# Patient Record
Sex: MALE | Race: WHITE | Employment: FULL TIME | URBAN - NONMETROPOLITAN AREA
[De-identification: names, ages, dates, MRNs, and addresses within clinical notes are randomized per-mention and may not be internally consistent; named-entity substitution may affect disease eponyms.]

---

## 2021-01-06 ENCOUNTER — APPOINTMENT (OUTPATIENT)
Dept: GENERAL RADIOLOGY | Age: 31
End: 2021-01-06
Payer: COMMERCIAL

## 2021-01-06 ENCOUNTER — HOSPITAL ENCOUNTER (EMERGENCY)
Age: 31
Discharge: HOME | End: 2021-01-06
Attending: EMERGENCY MEDICINE
Payer: COMMERCIAL

## 2021-01-06 VITALS
DIASTOLIC BLOOD PRESSURE: 65 MMHG | HEART RATE: 55 BPM | OXYGEN SATURATION: 100 % | RESPIRATION RATE: 16 BRPM | TEMPERATURE: 98.4 F | SYSTOLIC BLOOD PRESSURE: 132 MMHG

## 2021-01-06 DIAGNOSIS — S60.00XA CONTUSION OF FINGER OF RIGHT HAND, UNSPECIFIED FINGER, INITIAL ENCOUNTER: Primary | ICD-10-CM

## 2021-01-06 PROCEDURE — 6370000000 HC RX 637 (ALT 250 FOR IP): Performed by: EMERGENCY MEDICINE

## 2021-01-06 PROCEDURE — 73130 X-RAY EXAM OF HAND: CPT

## 2021-01-06 PROCEDURE — 99283 EMERGENCY DEPT VISIT LOW MDM: CPT

## 2021-01-06 RX ORDER — NAPROXEN 500 MG/1
500 TABLET ORAL 2 TIMES DAILY
Qty: 14 TABLET | Refills: 0 | Status: SHIPPED | OUTPATIENT
Start: 2021-01-06 | End: 2021-06-30

## 2021-01-06 RX ORDER — IBUPROFEN 200 MG
400 TABLET ORAL
Status: DISCONTINUED | OUTPATIENT
Start: 2021-01-06 | End: 2021-01-06 | Stop reason: HOSPADM

## 2021-01-06 RX ADMIN — IBUPROFEN 400 MG: 200 TABLET, FILM COATED ORAL at 15:36

## 2021-01-06 ASSESSMENT — PAIN DESCRIPTION - ORIENTATION: ORIENTATION: RIGHT

## 2021-01-06 ASSESSMENT — PAIN DESCRIPTION - LOCATION: LOCATION: FINGER (COMMENT WHICH ONE)

## 2021-01-06 NOTE — ED TRIAGE NOTES
Pt to the ED with a right 1st/2nd digit injury that occurred yesterday at work (states not workers comp claim). Reports it got crunched between a couple parts at work. Pain 6/10. Reports numbness in the fingertips.

## 2021-01-08 NOTE — ED PROVIDER NOTES
right hand fingers up in 24 hours ago. Patient states that he had a crush injury at work, somebody pushed a metal  between his fingers and a hard place. Patient has sensation, vascular intact distal to the point of contact, patient     CRITICAL CARE:       CONSULTS:  None    PROCEDURES:  None    FINAL IMPRESSION      1. Contusion of finger of right hand, unspecified finger, initial encounter          DISPOSITION/PLAN   Decision To Discharge    PATIENT REFERRED TO:  No follow-up provider specified.     DISCHARGE MEDICATIONS:  Discharge Medication List as of 1/6/2021  3:33 PM      START taking these medications    Details   naproxen (NAPROSYN) 500 MG tablet Take 1 tablet by mouth 2 times daily for 7 days, Disp-14 tablet, R-0Print             (Please note that portions of this note were completed with a voice recognition program.  Efforts were made to edit the dictations but occasionally words are mis-transcribed.)    DO Aileen Husain DO  01/08/21 1124

## 2021-06-30 ENCOUNTER — HOSPITAL ENCOUNTER (EMERGENCY)
Age: 31
Discharge: HOME | End: 2021-06-30

## 2021-06-30 ENCOUNTER — APPOINTMENT (OUTPATIENT)
Dept: CT IMAGING | Age: 31
End: 2021-06-30

## 2021-06-30 VITALS
SYSTOLIC BLOOD PRESSURE: 106 MMHG | TEMPERATURE: 98.3 F | WEIGHT: 140 LBS | HEART RATE: 61 BPM | DIASTOLIC BLOOD PRESSURE: 66 MMHG | RESPIRATION RATE: 14 BRPM | OXYGEN SATURATION: 98 %
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
## 2021-06-30 DIAGNOSIS — S39.012A BACK STRAIN, INITIAL ENCOUNTER: ICD-10-CM

## 2021-06-30 PROCEDURE — 72131 CT LUMBAR SPINE W/O DYE: CPT

## 2021-06-30 PROCEDURE — 72128 CT CHEST SPINE W/O DYE: CPT

## 2021-06-30 PROCEDURE — 99282 EMERGENCY DEPT VISIT SF MDM: CPT

## 2021-06-30 PROCEDURE — 6370000000 HC RX 637 (ALT 250 FOR IP): Performed by: NURSE PRACTITIONER

## 2021-06-30 RX ORDER — NAPROXEN 500 MG/1
500 TABLET ORAL 2 TIMES DAILY WITH MEALS
Qty: 30 TABLET | Refills: 0 | Status: SHIPPED | OUTPATIENT
Start: 2021-06-30 | End: 2021-07-15

## 2021-06-30 RX ORDER — LIDOCAINE 4 G/G
2 PATCH TOPICAL DAILY
Status: DISCONTINUED | OUTPATIENT
Start: 2021-06-30 | End: 2021-06-30 | Stop reason: HOSPADM

## 2021-06-30 RX ORDER — TIZANIDINE 4 MG/1
4 TABLET ORAL ONCE
Status: COMPLETED | OUTPATIENT
Start: 2021-06-30 | End: 2021-06-30

## 2021-06-30 RX ORDER — TIZANIDINE 4 MG/1
4 TABLET ORAL EVERY 6 HOURS PRN
Qty: 20 TABLET | Refills: 0 | Status: SHIPPED | OUTPATIENT
Start: 2021-06-30

## 2021-06-30 RX ORDER — NAPROXEN 250 MG/1
500 TABLET ORAL ONCE
Status: COMPLETED | OUTPATIENT
Start: 2021-06-30 | End: 2021-06-30

## 2021-06-30 RX ORDER — LIDOCAINE 4 G/G
1 PATCH TOPICAL DAILY
Qty: 30 PATCH | Refills: 0 | Status: SHIPPED | OUTPATIENT
Start: 2021-06-30 | End: 2021-07-30

## 2021-06-30 RX ADMIN — TIZANIDINE 4 MG: 4 TABLET ORAL at 12:16

## 2021-06-30 RX ADMIN — NAPROXEN 500 MG: 250 TABLET ORAL at 12:16

## 2021-06-30 ASSESSMENT — ENCOUNTER SYMPTOMS
BACK PAIN: 1
COLOR CHANGE: 0
STRIDOR: 0
COUGH: 0
WHEEZING: 0
ABDOMINAL PAIN: 0
SHORTNESS OF BREATH: 0
VOMITING: 0
NAUSEA: 0
CHEST TIGHTNESS: 0

## 2021-06-30 ASSESSMENT — PAIN DESCRIPTION - DESCRIPTORS: DESCRIPTORS: ACHING

## 2021-06-30 ASSESSMENT — PAIN DESCRIPTION - FREQUENCY: FREQUENCY: CONTINUOUS

## 2021-06-30 ASSESSMENT — PAIN SCALES - GENERAL
PAINLEVEL_OUTOF10: 7
PAINLEVEL_OUTOF10: 7

## 2021-06-30 ASSESSMENT — PAIN DESCRIPTION - LOCATION: LOCATION: BACK

## 2021-06-30 ASSESSMENT — PAIN DESCRIPTION - ORIENTATION: ORIENTATION: LOWER

## 2021-06-30 NOTE — ED PROVIDER NOTES
[facility] Emergency Department    CHIEF COMPLAINT       Chief Complaint   Patient presents with    Back Pain       Nurses Notes reviewed and I agree except as noted in the HPI. HISTORY OF PRESENT ILLNESS    Irina Coker augustin 36 y.o. male who presents to the ED for evaluation of back pain. This morning he was getting ready to go to work and he slipped on a tube of caulk making him fall and slide down 4 stairs. He states his back took most of the force of the fall. He denies hitting his head or passing out. He states its from the middle of his back down to his tailbone. He denies any shooting pain or numbness. He denies any SOB or chest pain. He does report decreased ROM of his spine but he is still able to walk and move his spine. His pain is rated a 6-8/10 depending on movement or rest. Movement makes the pain worse. Rest relieves some pain. He has not taken anything for the pain. HPI was provided by the patient. REVIEW OF SYSTEMS     Review of Systems   Constitutional: Negative for activity change, appetite change, chills, fatigue and fever. Respiratory: Negative for cough, chest tightness, shortness of breath, wheezing and stridor. Cardiovascular: Negative for chest pain, palpitations and leg swelling. Gastrointestinal: Negative for abdominal pain, nausea and vomiting. Genitourinary: Negative for difficulty urinating, enuresis, frequency and urgency. Musculoskeletal: Positive for arthralgias, back pain (midline back pain from thoracic spine down to tailbone) and myalgias. Negative for gait problem, neck pain and neck stiffness. Skin: Negative for color change, pallor, rash and wound. Neurological: Negative for dizziness, tremors, seizures, syncope, weakness, light-headedness, numbness and headaches. Hematological: Does not bruise/bleed easily. Psychiatric/Behavioral: Negative for confusion and self-injury. The patient is not nervous/anxious.          PAST MEDICAL HISTORY   No past and Affect: Mood normal.         Behavior: Behavior normal.         Thought Content: Thought content normal.         Judgment: Judgment normal.         DIFFERENTIAL DIAGNOSIS:   Back strain sprain fracture dislocation degenerative disc disease, spinal stenosis    DIAGNOSTIC RESULTS       RADIOLOGY: non-plainfilm images(s) such as CT, Ultrasound and MRI are read by the radiologist.  Plain radiographic images are visualized and preliminarily interpreted by the emergency physician unless otherwise stated below. CT THORACIC SPINE WO CONTRAST   Final Result       1. No acute fracture noted. 2. Small Schmorl's nodes in the thoracic spine between T7 and T11.   3. Otherwise negative CT scan of the thoracic spine. **This report has been created using voice recognition software. It may contain minor errors which are inherent in voice recognition technology. **      Final report electronically signed by DR Akil Serna on 6/30/2021 12:59 PM      CT LUMBAR SPINE WO CONTRAST   Final Result   1. Transitional vertebral body at the lumbosacral junction. 2. No acute fracture noted. 3. Minimally bulging discs at L2-3 and L3-4 resulting in mild canal and bilateral foraminal stenosis. 4. Otherwise negative CT scan of the lumbar spine. **This report has been created using voice recognition software. It may contain minor errors which are inherent in voice recognition technology. **      Final report electronically signed by DR Akil Serna on 6/30/2021 12:53 PM            LABS:   Labs Reviewed - No data to display    EMERGENCY DEPARTMENT COURSE:   Vitals:    Vitals:    06/30/21 1154   BP: 106/66   Pulse: 61   Resp: 14   Temp: 98.3 °F (36.8 °C)   TempSrc: Oral   SpO2: 98%   Weight: 140 lb (63.5 kg)       MDM    Patient was seen and evaluated in the emergency department, patient appeared to be in no acute distress, vital signs were reviewed, no significant findings noted.   Physical exam was completed, generalized tenderness to the back, some midline tenderness also noted. CT scan of the thoracic and lumbar spine was obtained and negative for acute fracture. Some chronic degenerative changes noted. Discussed my findings my plan of care with patient he is amenable with discharge. He requested a work note this was provided. Medications   naproxen (NAPROSYN) tablet 500 mg (500 mg Oral Given 6/30/21 1216)   tiZANidine (ZANAFLEX) tablet 4 mg (4 mg Oral Given 6/30/21 1216)       Patient was seenindependently by myself. The patient's final impression and disposition and plan was determined by myself. CRITICAL CARE:   None    CONSULTS:  None    PROCEDURES:  None    FINAL IMPRESSION     1. Fall on same level from slipping, tripping or stumbling, initial encounter    2. Back strain, initial encounter          DISPOSITION/PLAN   Patient discharged in stable condition    PATIENT REFERREDTO:  [address] [address],[address] [facility]. [address] [facility] [address] [facility]  Call   For follow up and evaluation      DISCHARGE MEDICATIONS:  Discharge Medication List as of 6/30/2021 12:54 PM      START taking these medications    Details   tiZANidine (ZANAFLEX) 4 MG tablet Take 1 tablet by mouth every 6 hours as needed (once), Disp-20 tablet, R-0Print      lidocaine 4 % external patch Place 1 patch onto the skin daily, Transdermal, DAILY Starting Wed 6/30/2021, Until Fri 7/30/2021, For 30 days, Disp-30 patch, R-0, Print             (Please note that portions of this note were completed with a voice recognition program.  Efforts were made to edit the dictations but occasionally words are mis-transcribed.)    Provider:  I personally performed the services described in the documentation,reviewed and edited the documentation which was dictated to the scribe in my presence, and it accurately records my words and actions.     Avila Escobar CNP 06/30/21 4:40 PM    [facility] Shawn, CHRISTINA - BRENDAN          [facility]

## 2021-06-30 NOTE — LETTER
[facility] EMERGENCY DEPT  [id number] [facility] [address]  Phone: [phone]               June 30, 2021    Patient: Lila Rosas   YOB: 1990   Date of Visit: 6/30/2021       To Whom It May Concern:    Lila Rosas was seen and treated in our emergency department on 6/30/2021. He may return to work on 7/2/2021.       Sincerely,       Marleny Escobar, APRN - CNP         Signature:__________________________________

## 2021-08-23 ENCOUNTER — HOSPITAL ENCOUNTER (EMERGENCY)
Age: 31
Discharge: HOME | End: 2021-08-23
Attending: EMERGENCY MEDICINE

## 2021-08-23 VITALS
OXYGEN SATURATION: 100 % | TEMPERATURE: 97.7 F | SYSTOLIC BLOOD PRESSURE: 106 MMHG | DIASTOLIC BLOOD PRESSURE: 65 MMHG | HEART RATE: 42 BPM | RESPIRATION RATE: 17 BRPM

## 2021-08-23 DIAGNOSIS — K02.9 DENTAL CARIES: Primary | ICD-10-CM

## 2021-08-23 PROCEDURE — 99283 EMERGENCY DEPT VISIT LOW MDM: CPT

## 2021-08-23 PROCEDURE — 6370000000 HC RX 637 (ALT 250 FOR IP): Performed by: EMERGENCY MEDICINE

## 2021-08-23 RX ORDER — ACETAMINOPHEN 500 MG
1000 TABLET ORAL ONCE
Status: COMPLETED | OUTPATIENT
Start: 2021-08-23 | End: 2021-08-23

## 2021-08-23 RX ORDER — CLINDAMYCIN HYDROCHLORIDE 150 MG/1
300 CAPSULE ORAL 3 TIMES DAILY
Qty: 60 CAPSULE | Refills: 0 | Status: SHIPPED | OUTPATIENT
Start: 2021-08-23 | End: 2021-09-02

## 2021-08-23 RX ORDER — IBUPROFEN 800 MG/1
800 TABLET ORAL ONCE
Status: COMPLETED | OUTPATIENT
Start: 2021-08-23 | End: 2021-08-23

## 2021-08-23 RX ORDER — IBUPROFEN 600 MG/1
600 TABLET ORAL EVERY 6 HOURS PRN
Qty: 40 TABLET | Refills: 0 | Status: SHIPPED | OUTPATIENT
Start: 2021-08-23 | End: 2021-09-02

## 2021-08-23 RX ORDER — CLINDAMYCIN HYDROCHLORIDE 150 MG/1
450 CAPSULE ORAL ONCE
Status: COMPLETED | OUTPATIENT
Start: 2021-08-23 | End: 2021-08-23

## 2021-08-23 RX ADMIN — CLINDAMYCIN HYDROCHLORIDE 450 MG: 150 CAPSULE ORAL at 04:05

## 2021-08-23 RX ADMIN — IBUPROFEN 800 MG: 800 TABLET, FILM COATED ORAL at 04:05

## 2021-08-23 RX ADMIN — ACETAMINOPHEN 1000 MG: 500 TABLET ORAL at 04:05

## 2021-08-23 ASSESSMENT — ENCOUNTER SYMPTOMS
EYE ITCHING: 0
CONSTIPATION: 0
NAUSEA: 0
BLOOD IN STOOL: 0
EYE DISCHARGE: 0
EYE REDNESS: 0
SORE THROAT: 0
BACK PAIN: 0
COUGH: 0
RHINORRHEA: 0
WHEEZING: 0
SHORTNESS OF BREATH: 0
SINUS PRESSURE: 0
DIARRHEA: 0
CHOKING: 0
TROUBLE SWALLOWING: 0
VOMITING: 0
EYE PAIN: 0
ABDOMINAL PAIN: 0
ABDOMINAL DISTENTION: 0
VOICE CHANGE: 0
PHOTOPHOBIA: 0
CHEST TIGHTNESS: 0

## 2021-08-23 ASSESSMENT — PAIN SCALES - GENERAL: PAINLEVEL_OUTOF10: 4

## 2021-08-23 NOTE — ED PROVIDER NOTES
[facility]  eMERGENCY dEPARTMENT eNCOUnter          CHIEF COMPLAINT       Chief Complaint   Patient presents with    Dental Pain     left side       Nurses Notes reviewed and I agree except as noted in the HPI. HISTORY OF PRESENT ILLNESS    Moshe Aguilar is a 36 y.o. male who presents dental pain left lower jaw. It is on the left-hand side. Patient states is been ongoing for several weeks. Patient was at work today and promised his wife that he would come in for evaluation and treatment. Patient has advanced dental caries throughout his mouth. To have these all appear old. There is some mild swelling with some mild gingival inflammation at the lower left jaw. Patient is not having any trismus. Is not having any fevers chills sweats. He is able to swallow. Currently the patient is resting comfortably on the cot in no apparent distress no other physical complaints at this time. REVIEW OF SYSTEMS     Review of Systems   Constitutional: Negative for activity change, appetite change, diaphoresis, fatigue and unexpected weight change. HENT: Negative for congestion, ear discharge, ear pain, hearing loss, rhinorrhea, sinus pressure, sore throat, trouble swallowing and voice change. Eyes: Negative for photophobia, pain, discharge, redness and itching. Respiratory: Negative for cough, choking, chest tightness, shortness of breath and wheezing. Cardiovascular: Negative for chest pain, palpitations and leg swelling. Gastrointestinal: Negative for abdominal distention, abdominal pain, blood in stool, constipation, diarrhea, nausea and vomiting. Endocrine: Negative for polydipsia, polyphagia and polyuria. Genitourinary: Negative for decreased urine volume, difficulty urinating, dysuria, enuresis, frequency, hematuria, penile pain, penile swelling, scrotal swelling and urgency.    Musculoskeletal: Negative for arthralgias, back pain, gait problem, myalgias, neck pain and neck stiffness. Skin: Negative for pallor and rash. Allergic/Immunologic: Negative for immunocompromised state. Neurological: Negative for dizziness, tremors, seizures, syncope, facial asymmetry, weakness, light-headedness, numbness and headaches. Hematological: Negative for adenopathy. Does not bruise/bleed easily. Psychiatric/Behavioral: Negative for agitation, hallucinations and suicidal ideas. The patient is not nervous/anxious. PAST MEDICAL HISTORY    has no past medical history on file. SURGICAL HISTORY      has no past surgical history on file. CURRENT MEDICATIONS       Discharge Medication List as of 8/23/2021  4:01 AM      CONTINUE these medications which have NOT CHANGED    Details   naproxen (NAPROSYN) 500 MG tablet Take 1 tablet by mouth 2 times daily (with meals) for 30 doses, Disp-30 tablet, R-0Print      tiZANidine (ZANAFLEX) 4 MG tablet Take 1 tablet by mouth every 6 hours as needed (once), Disp-20 tablet, R-0Print             ALLERGIES     is allergic to penicillins. FAMILY HISTORY     has no family status information on file. family history is not on file. SOCIAL HISTORY          PHYSICAL EXAM     INITIAL VITALS:  oral temperature is 97.7 °F (36.5 °C). His blood pressure is 106/65 and his pulse is 42 (abnormal). His respiration is 17 and oxygen saturation is 100%. Physical Exam  Vitals and nursing note reviewed. Constitutional:       General: He is not in acute distress. Appearance: He is well-developed. He is not diaphoretic. HENT:      Head: Normocephalic and atraumatic. Right Ear: Hearing, tympanic membrane, ear canal and external ear normal.      Left Ear: Hearing, tympanic membrane, ear canal and external ear normal.      Nose: Nose normal.      Mouth/Throat:      Lips: No lesions. Mouth: Mucous membranes are moist. No injury, lacerations, oral lesions or angioedema. Dentition: Abnormal dentition. Does not have dentures.  Dental tenderness, gingival swelling and dental caries present. No dental abscesses or gum lesions. Tongue: No lesions. Palate: No mass. Eyes:      General: Lids are normal. No scleral icterus. Right eye: No discharge. Left eye: No discharge. Conjunctiva/sclera: Conjunctivae normal.      Right eye: No exudate. Left eye: No exudate. Pupils: Pupils are equal, round, and reactive to light. Neck:      Thyroid: No thyromegaly. Vascular: No JVD. Trachea: No tracheal deviation. Cardiovascular:      Rate and Rhythm: Normal rate and regular rhythm. Pulses: Normal pulses. Heart sounds: Normal heart sounds, S1 normal and S2 normal. No murmur heard. No friction rub. No gallop. Pulmonary:      Effort: Pulmonary effort is normal. No respiratory distress. Breath sounds: Normal breath sounds. No stridor. No wheezing or rales. Chest:      Chest wall: No tenderness. Abdominal:      General: Bowel sounds are normal. There is no distension. Palpations: Abdomen is soft. There is no mass. Tenderness: There is no abdominal tenderness. There is no guarding or rebound. Musculoskeletal:         General: No tenderness. Normal range of motion. Cervical back: Normal range of motion and neck supple. Normal range of motion. Lymphadenopathy:      Cervical: No cervical adenopathy. Skin:     General: Skin is warm and dry. Findings: No bruising, ecchymosis, lesion or rash. Neurological:      General: No focal deficit present. Mental Status: He is alert and oriented to person, place, and time. Mental status is at baseline. Cranial Nerves: No cranial nerve deficit. Sensory: No sensory deficit. Motor: No weakness. Coordination: Coordination normal.      Gait: Gait normal.      Deep Tendon Reflexes: Reflexes are normal and symmetric.  Reflexes normal.   Psychiatric:         Speech: Speech normal.         Behavior: Behavior normal. Thought Content: Thought content normal.         Judgment: Judgment normal.           DIFFERENTIAL DIAGNOSIS:   Dental carry, dental abscess, gingival abscess, apical abscess    DIAGNOSTIC RESULTS     EKG: All EKG's are interpreted by the Emergency Department Physician who either signs or Co-signs this chart in the absence of a cardiologist.  None    RADIOLOGY: non-plain film images(s) such as CT, Ultrasound and MRI are read by the radiologist.  No orders to display         LABS:   Labs Reviewed - No data to display    EMERGENCY DEPARTMENT COURSE:   Vitals:    Vitals:    08/23/21 0345 08/23/21 0346   BP:  106/65   Pulse:  (!) 42   Resp:  17   Temp: 97.7 °F (36.5 °C)    TempSrc: Oral    SpO2:  100%     Patient was assessed at bedside decision to treat was made. There is gingival swelling without evidence of latia abscess. He has terrible dentition in his mouth. Patient is given a list of dental clinics with which she could follow-up including [facility] Patient is instructed to use Tylenol Motrin for his pain. He is given a prescription for clindamycin he is instructed to take that as prescribed. This was discussed extensively at bedside with the patient who understood and agreed with the plan. Patient is subsequently discharged home in stable condition. Patient has what appears to be dental caries. Patient is instructed to take the antibiotics as prescribed. He is instructed to use Tylenol along with the prescription Motrin that has been prescribed. He is instructed to [facility] with [facility] and call today for an appointment. Patient is instructed to return to the nearest emergency room immediately for any new or worsening complaints. CRITICAL CARE:   None    CONSULTS:  None    PROCEDURES:  None    FINAL IMPRESSION      1.  Dental caries          DISPOSITION/PLAN   Discharge    PATIENT REFERRED TO:   Estephania Torre  [address] [facility] [address]  [phone]  Call today        DISCHARGE MEDICATIONS:  Discharge Medication List as of 8/23/2021  4:01 AM      START taking these medications    Details   clindamycin (CLEOCIN) 150 MG capsule Take 2 capsules by mouth 3 times daily for 10 days, Disp-60 capsule, R-0Print      ibuprofen (IBU) 600 MG tablet Take 1 tablet by mouth every 6 hours as needed for Pain, Disp-40 tablet, R-0Print             (Please note that portions of this note were completed with a voice recognition program.  Efforts were made to edit the dictations but occasionally words are mis-transcribed.)    Sebastián Amor, DO Steffi Montaño, DO  08/23/21 408 [facility], DO  08/23/21 0790

## 2023-11-24 NOTE — ED TRIAGE NOTES
Pt c/o lower back pain after falling down the last 4 of his steps this morning. Pt denies loss of consciousness.
BACK PAIN